# Patient Record
(demographics unavailable — no encounter records)

---

## 2018-03-22 NOTE — PCM.HPR
H & P Addendum review





- H & P Addendum Review


Date of Original H & P: 02/23/18


Date Reviewed: 03/22/18


Time Reviewed: 09:53


Patient was Examined: No Changes

## 2018-03-22 NOTE — PCM.OPNOTE
- General Post-Op/Procedure Note


Date of Surgery/Procedure: 03/22/18


Operative Procedure(s): Colonoscopy


Findings: 





Sig Tics


Pre Op Diagnosis: Colon Screening


Post-Op Diagnosis: Same


Anesthesia Technique: MAC


Primary Surgeon: Thomas J Mohs


Complications: None


Condition: Good


Free Text/Narrative:: 


 Intake & Output











 03/21/18 03/22/18 03/22/18





 22:59 06:59 14:59


 


Intake Total   500


 


Balance   500

## 2018-03-22 NOTE — OR
Date of Procedure:  03/22/2018

 

PREOPERATIVE DIAGNOSIS:  Colon screening.

 

POSTOPERATIVE DIAGNOSIS:  Sigmoid diverticulosis.

 

PROCEDURE:  Colonoscopy.

 

ANESTHESIA:  IV sedation.

 

DESCRIPTION OF PROCEDURE:  The patient was brought to the procedure room, where

he was placed on his left side and IV sedation administered.  Digital rectal

exam was performed, which was normal.  Colonoscope was inserted and advanced to

the level of the cecum without difficulty.  Cecal position was confirmed by

identifying the ileocecal valve.  I could see over the valve and visualized most

of the cecum, but not the portion directly behind the folds of the bowel.  All

the surfaces up to that point were well visualized.  Prep was good.  Upon

withdrawing the scope, the ascending, transverse, and descending colon were

normal in appearance.  Sigmoid colon had several medium-sized diverticula

present.  Rectum was normal and retroflexion was normal.  Air was removed and

the scope withdrawn.  The patient tolerated the procedure well and returned to

recovery in stable condition.  Recommend routine colon screening again in 10

years.

 

MODL THOMAS J MOHS, MD

DD:  03/22/2018 10:21:25

DT:  03/22/2018 10:29:29

Job #:  [id number]